# Patient Record
Sex: FEMALE | Race: WHITE | NOT HISPANIC OR LATINO | Employment: UNEMPLOYED | URBAN - METROPOLITAN AREA
[De-identification: names, ages, dates, MRNs, and addresses within clinical notes are randomized per-mention and may not be internally consistent; named-entity substitution may affect disease eponyms.]

---

## 2023-06-12 ENCOUNTER — TELEPHONE (OUTPATIENT)
Dept: PRIMARY CARE | Facility: CLINIC | Age: 63
End: 2023-06-12
Payer: COMMERCIAL

## 2023-06-15 NOTE — TELEPHONE ENCOUNTER
Relay [facility] labs   LFTs slightly elevated, similar to in past   Due to fatty liver   Weight loss is the only 'cure' for a fatty liver.   90% of the time it is a benign (not-harmful) condition but in ~10% of people the fat in the liver causes enough inflammation that ultimately progresses to cause cirrhosis of the liver. We will be monitoring the liver function tests on routine bloodwork from now on. If the liver tests seem to be getting worse over time I will recommend a referral to a liver specialist at which time a liver biopsy is usually recommended.     Vitamin D level is low- add Vitamin D supplement in the amount of 2,000 IU (is OTC) and make sure to take it with a meal- it needs fat present to be absorbed into the body.   Vitamin D deficiency has been implicated in heart disease, chronic pain, Fibromyalgia, hypertension, arthritis, depression, inflammatory bowel disease, obesity, Crohns Disease, several cancers, Multiple Sclerosis and other autoimmune diseases. Vitamin D deficiency can also cause symptoms of fatigue and myalgias (muscle aches).   Goal Vitamin D level is ~50. Vitamin D3 is available over-the-counter in 1,000 IU, 2,000 IU, and 5,000 IU tablets.

## 2023-06-16 NOTE — TELEPHONE ENCOUNTER
Spoke with Pt relayed lab results  Pt stated she saw the labs and is going to change her eating habits   Pt stated she is taking a multivitamin that has 1,000 international units of Vitamin D as well as  a regular Vitamin D 1,000iu   I told her to add the extra 1 Vitamin D capsule for a total of 2,000 international units and to make sure to take it with a meal  Pt verbally understood

## 2023-08-26 PROBLEM — K76.0 FATTY LIVER: Status: ACTIVE | Noted: 2023-08-26

## 2023-08-26 PROBLEM — Z86.010 HISTORY OF COLON POLYPS: Status: ACTIVE | Noted: 2023-08-26

## 2023-08-26 PROBLEM — R91.1 LUNG NODULE: Status: ACTIVE | Noted: 2023-08-26

## 2023-08-26 PROBLEM — R93.1 ABNORMAL SCREENING CARDIAC CT: Status: ACTIVE | Noted: 2023-08-26

## 2023-08-26 PROBLEM — Z87.42 H/O ABNORMAL CERVICAL PAPANICOLAOU SMEAR: Status: ACTIVE | Noted: 2023-08-26

## 2023-08-26 PROBLEM — M47.816 LUMBAR SPONDYLOSIS: Status: ACTIVE | Noted: 2023-08-26

## 2023-08-26 PROBLEM — Z86.0100 HISTORY OF COLON POLYPS: Status: ACTIVE | Noted: 2023-08-26

## 2023-08-26 PROBLEM — I47.10 PAROXYSMAL SVT (SUPRAVENTRICULAR TACHYCARDIA) (CMS-HCC): Status: ACTIVE | Noted: 2023-08-26

## 2023-08-26 PROBLEM — G43.909 MIGRAINE WITHOUT STATUS MIGRAINOSUS, NOT INTRACTABLE: Status: ACTIVE | Noted: 2023-08-26

## 2023-08-26 PROBLEM — E55.9 VITAMIN D DEFICIENCY: Status: ACTIVE | Noted: 2023-08-26

## 2023-08-26 PROBLEM — M85.9 DISORDER OF BONE DENSITY AND STRUCTURE, UNSPECIFIED: Status: ACTIVE | Noted: 2023-08-26

## 2023-08-26 PROBLEM — E78.2 MIXED HYPERLIPIDEMIA: Status: ACTIVE | Noted: 2023-08-26

## 2023-08-26 PROBLEM — J30.9 ALLERGIC RHINITIS: Status: ACTIVE | Noted: 2023-08-26

## 2023-08-26 PROBLEM — R03.0 ELEVATED BLOOD PRESSURE READING WITHOUT DIAGNOSIS OF HYPERTENSION: Status: ACTIVE | Noted: 2023-08-26

## 2023-08-26 PROBLEM — B00.1 HERPES LABIALIS: Status: ACTIVE | Noted: 2023-08-26

## 2023-08-26 PROBLEM — Z85.828 H/O NONMELANOMA SKIN CANCER: Status: ACTIVE | Noted: 2023-08-26

## 2023-08-26 PROBLEM — E80.4 GILBERT SYNDROME: Status: ACTIVE | Noted: 2023-08-26

## 2023-08-26 PROBLEM — Z00.00 ROUTINE ADULT HEALTH MAINTENANCE: Status: ACTIVE | Noted: 2023-08-26

## 2023-08-26 PROBLEM — E04.2 MULTINODULAR GOITER: Status: ACTIVE | Noted: 2023-08-26

## 2023-08-26 PROBLEM — J47.9 BRONCHIECTASIS (MULTI): Status: ACTIVE | Noted: 2023-08-26

## 2023-08-26 PROBLEM — I28.8 DILATION OF PULMONARY ARTERY (MULTI): Status: ACTIVE | Noted: 2023-08-26

## 2023-09-22 DIAGNOSIS — E78.2 MIXED HYPERLIPIDEMIA: ICD-10-CM

## 2023-09-22 DIAGNOSIS — Z86.19 HISTORY OF HERPES LABIALIS: ICD-10-CM

## 2023-09-22 DIAGNOSIS — I47.10 SVT (SUPRAVENTRICULAR TACHYCARDIA) (CMS-HCC): ICD-10-CM

## 2023-09-22 RX ORDER — VALACYCLOVIR HYDROCHLORIDE 500 MG/1
500 TABLET, FILM COATED ORAL EVERY 12 HOURS PRN
Qty: 90 TABLET | Refills: 3 | Status: SHIPPED | OUTPATIENT
Start: 2023-09-22 | End: 2024-04-25 | Stop reason: SDUPTHER

## 2023-09-22 RX ORDER — ROSUVASTATIN CALCIUM 10 MG/1
1 TABLET, COATED ORAL 2 TIMES WEEKLY
COMMUNITY
Start: 2020-08-24 | End: 2023-09-22 | Stop reason: SDUPTHER

## 2023-09-22 RX ORDER — BISOPROLOL FUMARATE 10 MG/1
10 TABLET, FILM COATED ORAL DAILY
COMMUNITY
Start: 2016-12-15 | End: 2023-09-22 | Stop reason: SDUPTHER

## 2023-09-22 RX ORDER — VALACYCLOVIR HYDROCHLORIDE 500 MG/1
500 TABLET, FILM COATED ORAL EVERY 12 HOURS PRN
COMMUNITY
Start: 2019-04-10 | End: 2023-09-22 | Stop reason: SDUPTHER

## 2023-09-22 RX ORDER — BISOPROLOL FUMARATE 10 MG/1
10 TABLET, FILM COATED ORAL DAILY
Qty: 90 TABLET | Refills: 3 | Status: SHIPPED | OUTPATIENT
Start: 2023-09-22

## 2023-09-22 RX ORDER — ROSUVASTATIN CALCIUM 10 MG/1
10 TABLET, COATED ORAL 2 TIMES WEEKLY
Qty: 24 TABLET | Refills: 3 | Status: SHIPPED | OUTPATIENT
Start: 2023-09-25

## 2023-09-22 NOTE — TELEPHONE ENCOUNTER
Patient stopped by office to return call from yesterday.  Had requested that Bisoprolol, Valtrex and Rosuvastatin refills be sent to [facility] on [address] in [place].  There are currently no medications or pharmacies listed in her chart.

## 2023-10-02 ENCOUNTER — OFFICE VISIT (OUTPATIENT)
Dept: PRIMARY CARE | Facility: CLINIC | Age: 63
End: 2023-10-02
Payer: COMMERCIAL

## 2023-10-02 VITALS
BODY MASS INDEX: 31.86 KG/M2 | TEMPERATURE: 97.3 F | HEIGHT: 67 IN | OXYGEN SATURATION: 95 % | HEART RATE: 62 BPM | SYSTOLIC BLOOD PRESSURE: 122 MMHG | DIASTOLIC BLOOD PRESSURE: 76 MMHG | WEIGHT: 203 LBS

## 2023-10-02 DIAGNOSIS — R91.1 LUNG NODULE: ICD-10-CM

## 2023-10-02 DIAGNOSIS — Z12.11 SCREENING FOR COLON CANCER: ICD-10-CM

## 2023-10-02 DIAGNOSIS — I28.8 DILATION OF PULMONARY ARTERY (MULTI): ICD-10-CM

## 2023-10-02 DIAGNOSIS — I47.10 PAROXYSMAL SVT (SUPRAVENTRICULAR TACHYCARDIA) (CMS-HCC): ICD-10-CM

## 2023-10-02 DIAGNOSIS — Z12.31 ENCOUNTER FOR SCREENING MAMMOGRAM FOR BREAST CANCER: ICD-10-CM

## 2023-10-02 DIAGNOSIS — E78.2 MIXED HYPERLIPIDEMIA: ICD-10-CM

## 2023-10-02 DIAGNOSIS — L23.7 POISON IVY: ICD-10-CM

## 2023-10-02 DIAGNOSIS — L65.9 HAIR LOSS: ICD-10-CM

## 2023-10-02 DIAGNOSIS — K76.0 FATTY LIVER: ICD-10-CM

## 2023-10-02 DIAGNOSIS — J47.9 BRONCHIECTASIS WITHOUT COMPLICATION (MULTI): ICD-10-CM

## 2023-10-02 DIAGNOSIS — E04.2 MULTINODULAR GOITER: ICD-10-CM

## 2023-10-02 DIAGNOSIS — Z12.4 SCREENING FOR CERVICAL CANCER: ICD-10-CM

## 2023-10-02 DIAGNOSIS — Z00.00 ROUTINE ADULT HEALTH MAINTENANCE: Primary | ICD-10-CM

## 2023-10-02 DIAGNOSIS — E55.9 VITAMIN D DEFICIENCY: ICD-10-CM

## 2023-10-02 PROBLEM — R03.0 ELEVATED BLOOD PRESSURE READING WITHOUT DIAGNOSIS OF HYPERTENSION: Status: RESOLVED | Noted: 2023-08-26 | Resolved: 2023-10-02

## 2023-10-02 PROCEDURE — 1036F TOBACCO NON-USER: CPT | Performed by: INTERNAL MEDICINE

## 2023-10-02 PROCEDURE — 88175 CYTOPATH C/V AUTO FLUID REDO: CPT

## 2023-10-02 PROCEDURE — 99396 PREV VISIT EST AGE 40-64: CPT | Performed by: INTERNAL MEDICINE

## 2023-10-02 RX ORDER — CHOLECALCIFEROL (VITAMIN D3) 25 MCG
50 TABLET ORAL DAILY
COMMUNITY
Start: 2019-09-11

## 2023-10-02 RX ORDER — ERYTHROMYCIN 500 MG/1
500 TABLET, COATED ORAL 2 TIMES DAILY
Qty: 6 TABLET | Refills: 0 | Status: SHIPPED | OUTPATIENT
Start: 2023-10-02 | End: 2023-10-05

## 2023-10-02 RX ORDER — ERYTHROMYCIN 500 MG/1
500 TABLET, COATED ORAL 4 TIMES DAILY
COMMUNITY
Start: 2019-12-20 | End: 2023-10-02 | Stop reason: SDUPTHER

## 2023-10-02 RX ORDER — TRIAMCINOLONE ACETONIDE 1 MG/G
1 CREAM TOPICAL 2 TIMES DAILY
COMMUNITY
Start: 2017-09-05 | End: 2023-10-02 | Stop reason: SDUPTHER

## 2023-10-02 RX ORDER — TRIAMCINOLONE ACETONIDE 1 MG/G
1 CREAM TOPICAL 2 TIMES DAILY
Qty: 45 G | Refills: 2 | Status: SHIPPED | OUTPATIENT
Start: 2023-10-02

## 2023-10-02 RX ORDER — LUTEIN 6 MG
1 TABLET ORAL DAILY
COMMUNITY
Start: 2020-03-06

## 2023-10-02 RX ORDER — ASPIRIN 81 MG/1
1 TABLET ORAL DAILY
COMMUNITY
Start: 2020-08-24

## 2023-10-02 RX ORDER — FLUTICASONE PROPIONATE 50 MCG
2 SPRAY, SUSPENSION (ML) NASAL DAILY
COMMUNITY
Start: 2020-03-06

## 2023-10-02 ASSESSMENT — PATIENT HEALTH QUESTIONNAIRE - PHQ9
SUM OF ALL RESPONSES TO PHQ9 QUESTIONS 1 AND 2: 0
1. LITTLE INTEREST OR PLEASURE IN DOING THINGS: NOT AT ALL
2. FEELING DOWN, DEPRESSED OR HOPELESS: NOT AT ALL

## 2023-10-02 NOTE — ASSESSMENT & PLAN NOTE
Annual Wellness exam completed   Preventive Health history reviewed:  Labs ordered    Mammogram ordered  BMD - plan for age 65  Cscope due in 2024   PAP done

## 2023-10-02 NOTE — PROGRESS NOTES
Reason for Visit: Annual Physical Exam    Assessment and Plan:  Problem List Items Addressed This Visit          High    Routine adult health maintenance - Primary    Overview     Declines flu and covid 19 vaccines  Tdap (Age 7+)12/10/2007  Td 5/17, 5/26/2020  Mamm 4/17/18, 4/30/19; 9/4/20, 10/14/21, 1/3/23  PAP/HPV 10/13, 8/6/18 (-)  BMD: 6/20/17  Cscope 7/25/17 (3yrs); 9/21 (3yrs)  Hep C Ab (-) 8/31/18 5/19: Our cuff: 142/88           Her cuff: 160/83         Current Assessment & Plan       Annual Wellness exam completed   Preventive Health history reviewed:  Labs ordered    Mammogram ordered  BMD - plan for age 65  Cscope due in 2024   PAP done           Relevant Medications    triamcinolone (Kenalog) 0.1 % cream    erythromycin base (E-Mycin) 500 mg tablet    Other Relevant Orders    Urinalysis with Reflex Microscopic    Comprehensive Metabolic Panel    CBC and Auto Differential    Lipid Panel       Medium    Bronchiectasis (CMS/HCC)    Overview     3/20 CT chest: There is mild, diffuse bronchiectasis  Used to see Dr Freeman, [facility]  Asymptomatic  Monitor         Dilation of pulmonary artery (CMS/HCC)    Overview     CT chest 10/21: main pulmonary artery measures up to 3.3 cm which could be secondary to underlying pulmonary hypertension.   ECHO 2019: The main pulmonary artery diameter is 2.1 cm (diastolic).  CT chest 6/21: main pulmonary artery are normal in caliber  Asymptomatic  Monitor         Encounter for screening mammogram for breast cancer    Relevant Orders    BI mammo bilateral screening tomosynthesis    Fatty liver    Relevant Orders    Hepatic function panel    Lung nodule    Overview     CT 6/21: Stable 4mm nodule LLL  CT 12/20: Stable nonaggressive appearing 5 mm left lower lobe nodule. No other nodule identified on today's examination. This can be followed with a CT noncontrast chest examination in March 2021, in order to document 12 month stability and confirm benignity  9/19 CT calcium  score test showed 4 mm nodule in the left lower lobe. This can be revisited with follow-up CT in 1 year  3/20 CTChest: There is an indeterminate, approximately 4 mm nodule seen within the left lower lobe. There is an indeterminate, approximately 4 mm subpleural nodule seen within the right lung base. Indeterminate 2 mm nodule seen within the right middle lobe. Other subcentimeter indeterminate pulmonary nodules are also seen;  10/21 CT: Unchanged 0.5 cm solid lung nodule within the left lower lobe. The main pulmonary artery measures up to 3.3 cm which could be secondary to underlying pulmonary hypertension.   s/p Pulm [facility]  no f/u needed as nodules have been stable;         Mixed hyperlipidemia    Overview     Goal LDL <100  On Crestor  6/23:          Current Assessment & Plan     Increase crestor to 2x/week         Relevant Orders    TSH with reflex to Free T4 if abnormal    Comprehensive Metabolic Panel    Lipid Panel    Lipid panel    Hepatic function panel    Multinodular goiter    Overview     3/20: US thyroid: NODULE 1:Location: Left midSize: 0.7 x 0.7 x 0.9 cmNODULE 2:Location: Left lower poleSize: 1.2 x 1.1 x 1.3 cm  US Follow up imaging is advised annually for 5 years.  Per Endo 12/21: I compared the images myself.   Your nodules are stable, Recommend monitoring with a repeat thyroid US in 2 years;    10/22 US: Thyroid nodules are again seen, unchanged from prior study         Current Assessment & Plan     US thyroid         Relevant Orders    TSH with reflex to Free T4 if abnormal    Comprehensive Metabolic Panel    CBC and Auto Differential    US thyroid    Paroxysmal SVT (supraventricular tachycardia)    Overview     On BB for prevention  Also on ASA         Screening for cervical cancer    Relevant Orders    THINPREP PAP TEST    Screening for colon cancer    Relevant Orders    Colonoscopy Screening    Vitamin D deficiency    Overview     Comment on above: Goal 40-50On supplementInsurance doesnt  "cover testing;  Goal 40-50Level was 27 in 2014Started On supplement 2019Insurance doesnt cover testing;         Relevant Orders    Vitamin D 25-Hydroxy,Total (for eval of Vitamin D levels)     Other Visit Diagnoses       Hair loss        Relevant Orders    Urinalysis with Reflex Microscopic    TSH    T3    Ferritin    Zinc            HPI: Had sciatica recently, 14 weeks  Started Biotin 1000mcg  Nails are splitting and mild hair loss after COVID      ROS otherwise negative aside from what was mentioned above in HPI.    Vitals  /76   Pulse 62   Temp 36.3 °C (97.3 °F)   Ht 1.702 m (5' 7\")   Wt 92.1 kg (203 lb)   SpO2 95%   BMI 31.79 kg/m²   Body mass index is 31.79 kg/m².  Physical Exam  Gen: Alert, NAD  HEENT:  Normal exam  Neck:  No masses/nodes palpable; Thyroid nontender and without nodules; No KERRI  Respiratory:  Lungs CTAB  CV: RRR  Neuro:  Gross motor and sensory intact  Skin:  No suspicious lesions present  Breast: No masses or axillary lymphadenopathy  Gyn: Normal pelvic exam: no uterine masses or cervical lesions, or CMT; no vaginal D/C. No ovarian or adnexal masses; No external vaginal or anal/perineal lesions (Pt declined chaperone)      Active Problem List  Patient Active Problem List   Diagnosis    Abnormal screening cardiac CT    Routine adult health maintenance    H/O abnormal cervical Papanicolaou smear    History of colon polyps    History of malignant neoplasm of skin    BMI 31.0-31.9,adult    Allergic rhinitis    Bronchiectasis (CMS/HCC)    Disorder of bone density and structure, unspecified    Dilation of pulmonary artery (CMS/HCC)    Fatty liver    Gilbert syndrome    Herpes labialis    Lumbar spondylosis    Lung nodule    Migraine without status migrainosus, not intractable    Mixed hyperlipidemia    Multinodular goiter    Ovarian cyst    Paroxysmal SVT (supraventricular tachycardia)    Vitamin D deficiency    Screening for colon cancer    Screening for cervical cancer    Encounter for " screening mammogram for breast cancer       Comprehensive Medical/Surgical/Social/Family History  Past Medical History:   Diagnosis Date    Abnormal screening cardiac CT 09/10/2021    9/29: IMPRESSION: 1. Coronary artery calcium score of 453*. 2. 98th percentile** for age, gender in asymptomatic patients. *Coronary Artery  Agatston score risk= Moderately increased >300 Has seen Cardiology [facility] on ASA 81mg    H/O abdominal ultrasound     11/18: US abdomen normal 3/20: US thyroid: NODULE 1: Location: Left mid Size: 0.7 x 0.7 x 0.9 cm NODULE 2: Location: Left lower pole Size: 1.2 x 1.1 x 1.3 cm    H/O bone density study     2013: -1.5FRAX 4.7/0.3% 6/25/17: THE LOWEST T-SCORE IS -1.5    H/O cardiovascular stress test     12/19: CONCLUSIONS:  1. SPECT Perfusion Study: Normal.  2. There is no scintigraphic evidence for inducible ischemia.  3. No evidence of scarred myocardium.  4. Good functional capacity for age and gender.  5. Left ventricle is normal in size. The left ventricle systolic function is normal.  6. Right ventricle is normal in size.  7. This is a low risk scan.    H/O CT scan of chest     10//21:Interval resolution of groundglass opacity within the right lower lobe. Unchanged 0.5 cm solid lung nodule within the left lower lobe. The main pulmonary artery measures up to 3.3 cm, ?underlying pulm HTN 6/21: Interval progression of focal 3.8 x 2 x 5 cm area of groundglass opacity right lower lobe medially,  4 mm nodule left lower lobe, unchanged. 1 cm nodule inferior left thyroid, stable    H/O CT scan of chest 09/10/2021    10/2021 [facility]: Interval resolution of groundglass opacity within the right lower lobe.   Unchanged 0.5 cm solid lung nodule within the left lower lobe.   The main pulmonary artery measures up to 3.3 cm which could be secondary  to underlying pulmonary hypertension.    H/O CT scan of chest     12/20:Stable nonaggressive appearing 5 mm left lower lobe nodule.  No other nodule identified on today's  examination.   3/20:Mild, diffuse bronchiectasis. No acute pulmonary process is identified. Indeterminate subcentimeter pulmonary nodules, measuring up to 4 mm in size. No follow-up imaging for this/these incidentally detected lung nodule(s) is recommended.    H/O diagnostic ultrasound     2021 [facility]: nodule 1 - no FNA or further imaging advised; nodule 2 - fu imaging at 1, 2, 3 and 5 y; nodule 3 - TI-RADS5 - FNA advised  3/20: Thyroid nodules are present. Fine needle aspiration is recommended for  the nodule in the left lower pole of detailed in the synoptic report. 10/22 US:  Thyroid nodules are again seen, unchanged from prior study    H/O echocardiogram     : Normal LV fx, Trivial MR and TR. : - The left ventricle is normal in size. Left ventricular systolic function is normal. EF = 68  5% (2D 4-ch.) Normal global strain -24.8%  - The right ventricle is normal in size. Right ventricular systolic function is normal.    H/O magnetic resonance imaging of lumbar spine     : DDD at L4-5    H/O pelvic ultrasound      (-)    History of PFTs     : Spirometry is normal.     Past Surgical History:   Procedure Laterality Date    APPENDECTOMY  2018    Appendectomy    BI ULTRASOUND FINE NEEDLE ASPIRATION  2020    FNA thyroid: 3/20: FINAL DIAGNOSIS  A. THYROID, LEFT LOBE, FINE NEEDLE ASPIRATE (THINPREP AND SMEARS)  Benign.  Consistent with colloid nodule.     SECTION, CLASSIC  2018     Section    CHOLECYSTECTOMY  2018    Cholecystectomy    COLONOSCOPY  2022 : Impression:          - Non-bleeding internal hemorrhoids(5 yrs) : - One 3 mm polyp in the distal ascending colon,            removed with a cold biopsy forceps. Resected and            retrieved.    DILATION AND CURETTAGE OF UTERUS  2018    Dilation And Curettage    TUBAL LIGATION  2018    Tubal Ligation     Social History     Social History Narrative    , 4 kids     Retired from [employer]-- daughter at [employer]    Nonsmoker    Social ETOH    ---    Family History:    M: Hep C/Cirrhosis ( age 70)    F: Mesothelioma ( age 76)    S: CA colon (age 60) vs kidney ( age 61)    no other siblings       Allergies and Medications  Epinephrine, Penicillins, and Poison ivy  Current Outpatient Medications   Medication Instructions    aspirin 81 mg EC tablet 1 tablet, oral, Daily    bisoprolol (ZEBETA) 10 mg, oral, Daily    cholecalciferol (VITAMIN D-3) 50 mcg, oral, Daily    erythromycin base (E-MYCIN) 500 mg, oral, 2 times daily    fluticasone (Flonase) 50 mcg/actuation nasal spray 2 sprays, nasal, Daily    multivitamin capsule 1 capsule, oral, Daily    rosuvastatin (CRESTOR) 10 mg, oral, 2 times weekly    triamcinolone (Kenalog) 0.1 % cream 1 Application, Topical, 2 times daily    valACYclovir (VALTREX) 500 mg, oral, Every 12 hours PRN    vitamin E acid succinate (vitamin E succinate) 268 mg (400 unit) tablet 1 tablet, oral, Daily

## 2023-10-13 LAB
CYTOLOGY CMNT CVX/VAG CYTO-IMP: NORMAL
HPV HR GENOTYPES PNL CVX NAA+PROBE: NEGATIVE
HPV HR GENOTYPES PNL CVX NAA+PROBE: NEGATIVE
HPV16 DNA SPEC QL NAA+PROBE: NEGATIVE
HPV18 DNA SPEC QL NAA+PROBE: NEGATIVE
LAB AP HPV GENOTYPE QUESTION: YES
LAB AP HPV HR: NORMAL
LABORATORY COMMENT REPORT: NORMAL
PATH REPORT.TOTAL CANCER: NORMAL

## 2023-10-13 RX ORDER — PREDNISONE 20 MG/1
TABLET ORAL
Qty: 25 TABLET | Refills: 0 | Status: SHIPPED | OUTPATIENT
Start: 2023-10-13

## 2023-12-18 ENCOUNTER — TELEPHONE (OUTPATIENT)
Dept: PRIMARY CARE | Facility: CLINIC | Age: 63
End: 2023-12-18
Payer: COMMERCIAL

## 2024-03-08 ENCOUNTER — TELEPHONE (OUTPATIENT)
Dept: PRIMARY CARE | Facility: CLINIC | Age: 64
End: 2024-03-08
Payer: COMMERCIAL

## 2024-03-08 DIAGNOSIS — K64.9 HEMORRHOIDS, UNSPECIFIED HEMORRHOID TYPE: Primary | ICD-10-CM

## 2024-03-08 RX ORDER — HYDROCORTISONE 25 MG/G
CREAM TOPICAL 4 TIMES DAILY PRN
Qty: 30 G | Refills: 5 | Status: SHIPPED | OUTPATIENT
Start: 2024-03-08 | End: 2025-03-08

## 2024-03-08 NOTE — TELEPHONE ENCOUNTER
Pt left  stating that she has some internal hemorrhoids. She has had them in the past but the Preparation H doesn't seem to be working and she remembers getting a RX medication in the past.  Please advise

## 2024-04-25 DIAGNOSIS — Z86.19 HISTORY OF HERPES LABIALIS: ICD-10-CM

## 2024-04-25 RX ORDER — VALACYCLOVIR HYDROCHLORIDE 500 MG/1
500 TABLET, FILM COATED ORAL EVERY 12 HOURS PRN
Qty: 90 TABLET | Refills: 3 | Status: SHIPPED | OUTPATIENT
Start: 2024-04-25

## 2024-10-07 DIAGNOSIS — E78.2 MIXED HYPERLIPIDEMIA: ICD-10-CM

## 2024-10-07 DIAGNOSIS — I47.10 SVT (SUPRAVENTRICULAR TACHYCARDIA) (CMS-HCC): ICD-10-CM

## 2024-10-08 RX ORDER — ROSUVASTATIN CALCIUM 10 MG/1
10 TABLET, COATED ORAL 2 TIMES WEEKLY
Qty: 24 TABLET | Refills: 3 | Status: SHIPPED | OUTPATIENT
Start: 2024-10-10

## 2024-10-08 RX ORDER — BISOPROLOL FUMARATE 10 MG/1
10 TABLET, FILM COATED ORAL DAILY
Qty: 90 TABLET | Refills: 3 | Status: SHIPPED | OUTPATIENT
Start: 2024-10-08

## 2025-01-24 ENCOUNTER — TELEPHONE (OUTPATIENT)
Dept: PRIMARY CARE | Facility: CLINIC | Age: 65
End: 2025-01-24
Payer: COMMERCIAL

## 2025-01-24 DIAGNOSIS — Z12.31 ENCOUNTER FOR SCREENING MAMMOGRAM FOR BREAST CANCER: ICD-10-CM

## 2025-01-24 NOTE — TELEPHONE ENCOUNTER
Pt called in requesting a referral be placed for -BI mammo bilateral screening tomosynthesis. Pt previous Mammo was completed 12/19/23 and results stated-     IMPRESSION: BENIGN FINDING   There is no mammographic evidence of malignancy. A 1 year screening   mammogram is recommended.     Pt would like to complete before appointment with PCP 2/17/25 and stated that she will pick referral up from office.     Referral formatted - sending to PCP for FYI

## 2025-02-17 ENCOUNTER — APPOINTMENT (OUTPATIENT)
Dept: PRIMARY CARE | Facility: CLINIC | Age: 65
End: 2025-02-17
Payer: COMMERCIAL

## 2025-02-17 ENCOUNTER — HOSPITAL ENCOUNTER (OUTPATIENT)
Dept: RADIOLOGY | Facility: EXTERNAL LOCATION | Age: 65
Discharge: HOME | End: 2025-02-17

## 2025-02-17 VITALS
WEIGHT: 197 LBS | DIASTOLIC BLOOD PRESSURE: 75 MMHG | HEIGHT: 67 IN | OXYGEN SATURATION: 95 % | TEMPERATURE: 99.3 F | HEART RATE: 52 BPM | BODY MASS INDEX: 30.92 KG/M2 | SYSTOLIC BLOOD PRESSURE: 118 MMHG

## 2025-02-17 DIAGNOSIS — M85.9 DISORDER OF BONE DENSITY AND STRUCTURE, UNSPECIFIED: ICD-10-CM

## 2025-02-17 DIAGNOSIS — Z71.89 CARDIAC RISK COUNSELING: ICD-10-CM

## 2025-02-17 DIAGNOSIS — Z71.89 ADVANCED DIRECTIVES, COUNSELING/DISCUSSION: ICD-10-CM

## 2025-02-17 DIAGNOSIS — Z13.39 ALCOHOL SCREENING: ICD-10-CM

## 2025-02-17 DIAGNOSIS — J47.9 BRONCHIECTASIS WITHOUT COMPLICATION (MULTI): ICD-10-CM

## 2025-02-17 DIAGNOSIS — I28.8 DILATION OF PULMONARY ARTERY (MULTI): ICD-10-CM

## 2025-02-17 DIAGNOSIS — Z78.0 MENOPAUSE: ICD-10-CM

## 2025-02-17 DIAGNOSIS — Z12.11 SCREENING FOR COLON CANCER: ICD-10-CM

## 2025-02-17 DIAGNOSIS — E04.2 MULTINODULAR GOITER: ICD-10-CM

## 2025-02-17 DIAGNOSIS — E78.2 MIXED HYPERLIPIDEMIA: ICD-10-CM

## 2025-02-17 DIAGNOSIS — Z13.9 ENCOUNTER FOR SCREENING INVOLVING SOCIAL DETERMINANTS OF HEALTH (SDOH): ICD-10-CM

## 2025-02-17 DIAGNOSIS — Z13.89 SCREENING FOR MULTIPLE CONDITIONS: ICD-10-CM

## 2025-02-17 DIAGNOSIS — Z86.0100 HISTORY OF COLON POLYPS: ICD-10-CM

## 2025-02-17 DIAGNOSIS — I47.10 PAROXYSMAL SVT (SUPRAVENTRICULAR TACHYCARDIA) (CMS-HCC): ICD-10-CM

## 2025-02-17 DIAGNOSIS — E66.811 CLASS 1 OBESITY: ICD-10-CM

## 2025-02-17 DIAGNOSIS — Z12.31 ENCOUNTER FOR SCREENING MAMMOGRAM FOR BREAST CANCER: ICD-10-CM

## 2025-02-17 DIAGNOSIS — K76.0 FATTY LIVER: ICD-10-CM

## 2025-02-17 DIAGNOSIS — Z13.6 SCREENING FOR CARDIOVASCULAR CONDITION: ICD-10-CM

## 2025-02-17 DIAGNOSIS — E55.9 VITAMIN D DEFICIENCY: ICD-10-CM

## 2025-02-17 DIAGNOSIS — R91.1 LUNG NODULE: ICD-10-CM

## 2025-02-17 DIAGNOSIS — R93.1 ABNORMAL SCREENING CARDIAC CT: ICD-10-CM

## 2025-02-17 DIAGNOSIS — Z00.00 ROUTINE ADULT HEALTH MAINTENANCE: Primary | ICD-10-CM

## 2025-02-17 PROBLEM — B00.1 HERPES LABIALIS: Status: RESOLVED | Noted: 2023-08-26 | Resolved: 2025-02-17

## 2025-02-17 PROCEDURE — G0442 ANNUAL ALCOHOL SCREEN 15 MIN: HCPCS | Performed by: INTERNAL MEDICINE

## 2025-02-17 PROCEDURE — G0446 INTENS BEHAVE THER CARDIO DX: HCPCS | Performed by: INTERNAL MEDICINE

## 2025-02-17 PROCEDURE — G0403 EKG FOR INITIAL PREVENT EXAM: HCPCS | Performed by: INTERNAL MEDICINE

## 2025-02-17 PROCEDURE — 1160F RVW MEDS BY RX/DR IN RCRD: CPT | Performed by: INTERNAL MEDICINE

## 2025-02-17 PROCEDURE — G2211 COMPLEX E/M VISIT ADD ON: HCPCS | Performed by: INTERNAL MEDICINE

## 2025-02-17 PROCEDURE — G0447 BEHAVIOR COUNSEL OBESITY 15M: HCPCS | Performed by: INTERNAL MEDICINE

## 2025-02-17 PROCEDURE — 1159F MED LIST DOCD IN RCRD: CPT | Performed by: INTERNAL MEDICINE

## 2025-02-17 PROCEDURE — 1036F TOBACCO NON-USER: CPT | Performed by: INTERNAL MEDICINE

## 2025-02-17 PROCEDURE — 3008F BODY MASS INDEX DOCD: CPT | Performed by: INTERNAL MEDICINE

## 2025-02-17 PROCEDURE — 99214 OFFICE O/P EST MOD 30 MIN: CPT | Performed by: INTERNAL MEDICINE

## 2025-02-17 PROCEDURE — G0402 INITIAL PREVENTIVE EXAM: HCPCS | Performed by: INTERNAL MEDICINE

## 2025-02-17 RX ORDER — NICOTINE POLACRILEX 2 MG
1 GUM BUCCAL DAILY
COMMUNITY

## 2025-02-17 RX ORDER — CHOLECALCIFEROL (VITAMIN D3) 25 MCG
2000 TABLET ORAL DAILY
Start: 2025-02-17

## 2025-02-17 SDOH — ECONOMIC STABILITY: FOOD INSECURITY: WITHIN THE PAST 12 MONTHS, THE FOOD YOU BOUGHT JUST DIDN'T LAST AND YOU DIDN'T HAVE MONEY TO GET MORE.: NEVER TRUE

## 2025-02-17 SDOH — ECONOMIC STABILITY: INCOME INSECURITY: IN THE LAST 12 MONTHS, WAS THERE A TIME WHEN YOU WERE NOT ABLE TO PAY THE MORTGAGE OR RENT ON TIME?: NO

## 2025-02-17 SDOH — ECONOMIC STABILITY: TRANSPORTATION INSECURITY
IN THE PAST 12 MONTHS, HAS LACK OF TRANSPORTATION KEPT YOU FROM MEETINGS, WORK, OR FROM GETTING THINGS NEEDED FOR DAILY LIVING?: NO

## 2025-02-17 SDOH — ECONOMIC STABILITY: FOOD INSECURITY: WITHIN THE PAST 12 MONTHS, YOU WORRIED THAT YOUR FOOD WOULD RUN OUT BEFORE YOU GOT MONEY TO BUY MORE.: NEVER TRUE

## 2025-02-17 SDOH — ECONOMIC STABILITY: TRANSPORTATION INSECURITY
IN THE PAST 12 MONTHS, HAS THE LACK OF TRANSPORTATION KEPT YOU FROM MEDICAL APPOINTMENTS OR FROM GETTING MEDICATIONS?: NO

## 2025-02-17 ASSESSMENT — PATIENT HEALTH QUESTIONNAIRE - PHQ9
SUM OF ALL RESPONSES TO PHQ9 QUESTIONS 1 AND 2: 0
2. FEELING DOWN, DEPRESSED OR HOPELESS: NOT AT ALL
1. LITTLE INTEREST OR PLEASURE IN DOING THINGS: NOT AT ALL

## 2025-02-17 ASSESSMENT — SOCIAL DETERMINANTS OF HEALTH (SDOH): IN THE PAST 12 MONTHS, HAS THE ELECTRIC, GAS, OIL, OR WATER COMPANY THREATENED TO SHUT OFF SERVICE IN YOUR HOME?: NO

## 2025-02-17 NOTE — ASSESSMENT & PLAN NOTE
Annual Wellness exam completed   Preventive Health History reviewed  Ordered:   Labs    Mammogram   BMD   Cscope

## 2025-02-17 NOTE — PROGRESS NOTES
Annual Medicare Wellness Exam/Comprehensive Problem Focused Follow Up  HPI/CC  Chief Complaint   Patient presents with    Welcome To Medicare     Reviewed chart for care received since last appointment.  No new issues  Sees eye MD and dentist regularly    Assessment and Plan:  Problem List Items Addressed This Visit          High    Routine adult health maintenance - Primary    Overview     Declines flu and covid 19 vaccines  Tdap (Age 7+)12/10/2007  Td 5/17, 5/26/2020  Mamm 4/17/18, 4/30/19; 9/4/20, 10/14/21, 1/3/23, 12/21/23, 02/03/25  PAP/HPV 10/13, 8/6/18 (-); 10/13/23 (-)  BMD: 6/20/17  Cscope 7/25/17 (3yrs); 9/21 (3yrs)  Hep C Ab (-) 8/31/18 5/19: Our cuff: 142/88           Her cuff: 160/83         Current Assessment & Plan     Annual Wellness exam completed   Preventive Health History reviewed  Ordered:   Labs    Mammogram   BMD   Cscope                   Medium    Abnormal screening cardiac CT    Overview     9/19: Coronary artery calcium score of 453 (LAD/LM)  98th percentile for age, gender in asymptomatic patients. *Coronary Artery  Agatston score risk= Moderately increased >300   Has seen Cardiology [facility]   Goal LDL<100  on ASA 81mg and statin         Relevant Orders    CT cardiac scoring wo IV contrast    ECG 12 lead (Clinic Performed) (Completed)    Advanced directives, counseling/discussion    Overview     2/17/25: HCPOA will be  (Jere Rivera)  FULL CODE         Alcohol screening    Overview     02/17/25: 5 minutes spent screening for alcohol misuse  See snapshot (social documentation) for details.           BMI 30.0-30.9,adult    Overview     She read the Obesity Code  Doing IF         Current Assessment & Plan     15 minutes spent discussing:  BMI was above normal measurement. Current weight: 89.4 kg (197 lb)  Weight change since last visit (-) denotes wt loss -6 lbs   Weight loss needed to achieve BMI 25: 37.7 Lbs  Weight loss needed to achieve BMI 30: 5.9 Lbs  Continue IF Consider GLP1  med (danny if gains FDA approval for fatty liver treatment)           Bronchiectasis    Overview     3/20 CT chest: There is mild, diffuse bronchiectasis  Used to see Dr Freeman, [facility]  Asymptomatic  Monitor         Cardiac risk counseling    Overview     2/17/25: ASCVD risk 5.9%  On ASA by cardiologist (elevated Calcium score)         Dilation of pulmonary artery (Multi)    Overview     ECHO 2019: The main pulmonary artery diameter is 2.1 cm (diastolic).  CT chest 6/21: main pulmonary artery are normal in caliber  CT chest 10/21: main pulmonary artery measures up to 3.3 cm which could be secondary to underlying pulmonary hypertension.   Asymptomatic  Monitor         Disorder of bone density and structure, unspecified    Overview     BMD 11/19: Lowest T score -1.6.   10-year absolute fracture risk:  major osteoporotic fracture = 7.5 %   hip fracture = 0.6 %          Relevant Orders    XR DEXA bone density (Completed)    Encounter for screening involving social determinants of health (SDoH)    Overview     02/17/25: 5 minutes spent on SDOH screening.  Specifically: Housing, Food Insecurity, Utilities and Transportatin Needs were evaluated   (See Screenings in Rooming section for documentation)           Encounter for screening mammogram for breast cancer    Relevant Orders    BI mammo bilateral screening tomosynthesis (Completed)    Fatty liver    Overview     US 2011: Fatty infiltration of the liver         Current Assessment & Plan     Elevated FIB-4 score  Will get Fibroscan         Relevant Orders    US thyroid (Completed)    Liver Elastography (Fibroscan)    History of colon polyps    Overview     9/21: Sessile serrated polyp         Relevant Orders    Colonoscopy Screening; Average Risk Patient    Lung nodule    Overview     CT 6/21: Stable 4mm nodule LLL  CT 12/20: Stable nonaggressive appearing 5 mm left lower lobe nodule. No other nodule identified on today's examination. This can be followed with a CT  noncontrast chest examination in March 2021, in order to document 12 month stability and confirm benignity  9/19 CT calcium score test showed 4 mm nodule in the left lower lobe. This can be revisited with follow-up CT in 1 year  3/20 CTChest: There is an indeterminate, approximately 4 mm nodule seen within the left lower lobe. There is an indeterminate, approximately 4 mm subpleural nodule seen within the right lung base. Indeterminate 2 mm nodule seen within the right middle lobe. Other subcentimeter indeterminate pulmonary nodules are also seen;  10/21 CT: Unchanged 0.5 cm solid lung nodule within the left lower lobe. The main pulmonary artery measures up to 3.3 cm which could be secondary to underlying pulmonary hypertension.   s/p Pulm [facility]  no f/u needed as nodules have been stable;         Menopause    Relevant Orders    XR DEXA bone density (Completed)    Mixed hyperlipidemia    Overview     Goal LDL <100  On Crestor  6/23:          Relevant Orders    CT cardiac scoring wo IV contrast    TSH with reflex to Free T4 if abnormal    Comprehensive Metabolic Panel    CBC and Auto Differential    Lipid Panel    Urinalysis with Reflex Microscopic    TSH with reflex to Free T4 if abnormal    Comprehensive Metabolic Panel    CBC and Auto Differential    Lipid Panel    Urinalysis with Reflex Microscopic    Multinodular goiter    Overview     3/20: US thyroid: NODULE 1:Location: Left midSize: 0.7 x 0.7 x 0.9 cmNODULE 2:Location: Left lower poleSize: 1.2 x 1.1 x 1.3 cm  US Follow up imaging is advised annually for 5 years.  Per Endo 12/21: I compared the images myself.   Your nodules are stable, Recommend monitoring with a repeat thyroid US in 2 years;    10/22 US: Thyroid nodules are again seen, unchanged from prior study  12/23: Similar appearance of a multinodular goiter.  Unchanged 2 TI-RADS 4 lesions within the left mid and lower poles.   NODULE 1:  Left Mid pole Size: 1.0 x 1.3 x 0.8 cm, previously 0.9 x 1.3 x  "0.8 cm TI-RADS Category: TR4 ACR Recommendation: TI-RADS 4 nodule.  Follow up imaging in 1, 2, 3 and 5 years is advised.   NODULE 2: Left lower pole  Size: 1.3 x 1.4 x 1.1 cm, previously 1.3 x 1.4 x 1.1 cm . TI-RADS Category: TR4 ACR Recommendation: TI-RADS 4 nodule.  Follow up imaging in 1, 2, 3 and 5 years is advised.   NODULE 3: Location: Mid isthmus Size: 0.9 x 0.8 x 0.5 cm, previously 0.7 x 0.5 x 0.5 cm. TI-RADS Category: TR3 ACR Recommendation: TI-RADS 3 nodule.    Follow up imaging in 1, 2, 3 and 5 years is advised for the TR4 nodule         Paroxysmal SVT (supraventricular tachycardia) (CMS-HCC)    Overview     On BB for prevention  Also on ASA         Relevant Orders    ECG 12 lead (Clinic Performed) (Completed)    Screening for colon cancer    Relevant Orders    Colonoscopy Screening; Average Risk Patient    Screening for multiple conditions    Overview     02/17/25:Depression screening completed using the PHQ-2 questions with results documented in the chart/encounter  (See Rooming Screenings for documentation)            Vitamin D deficiency    Overview     Level was 27 in 2014  On supplement          Relevant Medications    cholecalciferol (Vitamin D-3) 25 MCG (1000 UT) tablet    Other Relevant Orders    Vitamin D 25-Hydroxy,Total (for eval of Vitamin D levels)    Vitamin B12    Vitamin D 25-Hydroxy,Total (for eval of Vitamin D levels)    Vitamin B12     Other Visit Diagnoses       Screening for cardiovascular condition        Relevant Orders    CT cardiac scoring wo IV contrast    Class 1 obesity [E66.811]                  ROS otherwise negative aside from what was mentioned above in HPI.    Vitals  /75   Pulse 52   Temp 37.4 °C (99.3 °F)   Ht 1.702 m (5' 7\")   Wt 89.4 kg (197 lb)   SpO2 95%   BMI 30.85 kg/m²   Body mass index is 30.85 kg/m².  Physical Exam  Gen: Alert, NAD  HEENT:  Unremarkable  Neck:  No KERRI  Respiratory:  Lungs CTAB  Cardiovascular:  Heart RRR  Neuro:  Gross motor and " sensory intact  Skin:  No suspicious lesions present  Breast: No masses, or axillary lymphadenopathy  Gyn: Normal pelvic exam: no uterine masses or cervical lesions, or CMT; no vaginal D/C. No ovarian or adnexal masses; No external vaginal or anal/perineal lesions (Pt declined chaperone)    EKG: NSR, elevated J point II, downgoing T wave AVL  asymptomatic    Patient Care Team:  Seema Bella MD as PCP - General (Internal Medicine)  INDIO Vasquez-CNP as Referring Physician (Family Medicine)  Mar Freeman MD as Referring Physician (Pulmonary Disease)  Robert Hayward MD as Referring Physician (Interventional Cardiology)  Marek Benitez OD as Referring Physician (Optometry)     Health Risk Assessment:  Patient was asked if he/she has any difficulty performing the following activities of daily living:  Preparing nutritious food and eating? No  Grocery shopping? No  Bathing and grooming yourself? No  Getting dressed? No  Using the toilet?No  Using the phone? No  Moving around from place to place (physical ambulation)? No  Doing housework (including laundry) independently? No  Managing finances independently? No  Managing medications independently? No  Doing housework (including laundry) independently? No    Patient was asked if he/she:  Feels safe in current home environment?: Yes  Uses seatbelt? Yes  Sees the dentist regularly? Yes  Exercises regularly: No  Suffers from depression, stress, anger, loneliness or social isolation, pain, suicidality? No    Dietary issues discussed: Yes  Cognitive Impairment No  Hearing difficulties: No  Visual Acuity assessed: Yes, Snellen chart dine and also see eye MD regularly too    What is your self-assessment of overall health status and life satisfaction? Excellent     5 minutes spent on SDOH screening:   Specifically Housing, Food Insecurity, Utilities and Transportatin Needs were evaluated   (See Screenings in Rooming section for documentation)  Food Insecurity: No Food  Insecurity (2/17/2025)    Hunger Vital Sign     Worried About Running Out of Food in the Last Year: Never true     Ran Out of Food in the Last Year: Never true     Housing Stability: Unknown (2/17/2025)    Housing Stability Vital Sign     Unable to Pay for Housing in the Last Year: No     Number of Times Moved in the Last Year: Not on file     Homeless in the Last Year: No     Transportation Needs: No Transportation Needs (2/17/2025)    PRAPARE - Transportation     Lack of Transportation (Medical): No     Lack of Transportation (Non-Medical): No       Allergies and Medications  Epinephrine, Penicillins, and Poison ivy  Current Outpatient Medications   Medication Instructions    aspirin 81 mg EC tablet 1 tablet, Daily    biotin 1 mg capsule 1 capsule, oral, Daily    bisoprolol (ZEBETA) 10 mg, oral, Daily    cholecalciferol (VITAMIN D-3) 2,000 Units, oral, Daily    fluticasone (Flonase) 50 mcg/actuation nasal spray 2 sprays, nasal, Daily PRN    hydrocortisone (Anusol-HC) 2.5 % rectal cream rectal, 4 times daily PRN    multivitamin capsule 1 capsule, Daily    predniSONE (Deltasone) 20 mg tablet Take 2 tabs (40mg) daily for 7 days, then take 1 tab (20mg) daily for 7 days, then take 1/2 tab (10mg) daily for 7 days.    rosuvastatin (CRESTOR) 10 mg, oral, 2 times weekly    triamcinolone (Kenalog) 0.1 % cream 1 Application, Topical, 2 times daily    valACYclovir (VALTREX) 500 mg, oral, Every 12 hours PRN    vitamin E acid succinate (vitamin E succinate) 268 mg (400 unit) tablet 1 tablet, oral, Daily       Medications and Supplements  prescribed by me and other practitioners or clinical pharmacist (such as prescriptions, OTC's, herbal therapies and supplements) were reviewed and documented in the medical record.      Active Problem List  Patient Active Problem List   Diagnosis    Abnormal screening cardiac CT    Routine adult health maintenance    H/O abnormal cervical Papanicolaou smear    History of colon polyps    History  of malignant neoplasm of skin    BMI 30.0-30.9,adult    Allergic rhinitis    Bronchiectasis    Disorder of bone density and structure, unspecified    Dilation of pulmonary artery (Multi)    Fatty liver    Gilbert syndrome    Lumbar spondylosis    Lung nodule    Migraine without status migrainosus, not intractable    Mixed hyperlipidemia    Multinodular goiter    Ovarian cyst    Paroxysmal SVT (supraventricular tachycardia) (CMS-HCC)    Vitamin D deficiency    Screening for colon cancer    Screening for cervical cancer    Encounter for screening mammogram for breast cancer    Alcohol screening    Encounter for screening involving social determinants of health (SDoH)    Screening for multiple conditions    Advanced directives, counseling/discussion    Cardiac risk counseling    Menopause       Comprehensive Medical/Surgical/Social/Family History  Past Medical History:   Diagnosis Date    Abnormal screening cardiac CT 09/10/2021    9/29: 1. Coronary artery calcium score of 453*. 2. 98th percentile** for age, gender in asymptomatic patients. *Coronary Artery  Agatston score risk= Moderately increased >300 Has seen Cardiology [facility] on ASA 81mg    H/O abdominal ultrasound     11/18: US abdomen normal 3/20: US thyroid: NODULE 1: Location: Left mid Size: 0.7 x 0.7 x 0.9 cm NODULE 2: Location: Left lower pole Size: 1.2 x 1.1 x 1.3 cm    H/O bone density study     2013: -1.5FRAX 4.7/0.3% 6/25/17: THE LOWEST T-SCORE IS -1.5    H/O bone density study 11/2019    Lowest T score -1.6. 10-year absolute fracture risk:                                         - major osteoporotic fracture =  7.5 %                                         - hip fracture = 0.6 %    H/O cardiovascular stress test     12/19: CONCLUSIONS:  1. SPECT Perfusion Study: Normal.  2. There is no scintigraphic evidence for inducible ischemia.  3. No evidence of scarred myocardium.  4. Good functional capacity for age and gender.  5. Left ventricle is normal in size.  The left ventricle systolic function is normal.  6. Right ventricle is normal in size.  7. This is a low risk scan.    H/O CT scan of chest     10//21:Interval resolution of groundglass opacity within the right lower lobe. Unchanged 0.5 cm solid lung nodule within the left lower lobe. The main pulmonary artery measures up to 3.3 cm, ?underlying pulm HTN 6/21: Interval progression of focal 3.8 x 2 x 5 cm area of groundglass opacity right lower lobe medially,  4 mm nodule left lower lobe, unchanged. 1 cm nodule inferior left thyroid, stable    H/O CT scan of chest 09/10/2021    10/2021 [facility]: Interval resolution of groundglass opacity within the right lower lobe.   Unchanged 0.5 cm solid lung nodule within the left lower lobe.   The main pulmonary artery measures up to 3.3 cm which could be secondary  to underlying pulmonary hypertension.    H/O CT scan of chest     12/20:Stable nonaggressive appearing 5 mm left lower lobe nodule.  No other nodule identified on today's examination.   3/20:Mild, diffuse bronchiectasis. No acute pulmonary process is identified. Indeterminate subcentimeter pulmonary nodules, measuring up to 4 mm in size. No follow-up imaging for this/these incidentally detected lung nodule(s) is recommended.    H/O diagnostic ultrasound     11/2021 [facility]: nodule 1 - no FNA or further imaging advised; nodule 2 - fu imaging at 1, 2, 3 and 5 y; nodule 3 - TI-RADS5 - FNA advised  3/20: Thyroid nodules are present. Fine needle aspiration is recommended for  the nodule in the left lower pole of detailed in the synoptic report. 10/22 US:  Thyroid nodules are again seen, unchanged from prior study    H/O diagnostic ultrasound 12/18/2023    US thyroid: Similar appearance of a multinodular goiter.  Unchanged 2 TI-RADS 4  lesions within the left mid and lower poles.    H/O echocardiogram     2007: Normal LV fx, Trivial MR and TR. 12/19: - The left ventricle is normal in size. Left ventricular systolic function is  normal. EF = 68  5% (2D 4-ch.) Normal global strain -24.8%  - The right ventricle is normal in size. Right ventricular systolic function is normal.    H/O magnetic resonance imaging of lumbar spine     : DDD at L4-5    H/O pelvic ultrasound      (-)    History of PFTs     : Spirometry is normal.     Past Surgical History:   Procedure Laterality Date    APPENDECTOMY  2018    Appendectomy    BI US FINE NEEDLE ASPIRATION  2020    FNA thyroid: 3/20: FINAL DIAGNOSIS  A. THYROID, LEFT LOBE, FINE NEEDLE ASPIRATE (THINPREP AND SMEARS)  Benign.  Consistent with colloid nodule.     SECTION, CLASSIC  2018     Section    CHOLECYSTECTOMY  2018    Cholecystectomy    COLONOSCOPY  2022 : Impression:          - Non-bleeding internal hemorrhoids(5 yrs) : - One 3 mm polyp in the distal ascending colon,            removed with a cold biopsy forceps. Resected and            retrieved.    DILATION AND CURETTAGE OF UTERUS  2018    Dilation And Curettage    TUBAL LIGATION  2018    Tubal Ligation     Social History     Social History Narrative    Social History:    , 4 kids    Retired from [employer]    Nonsmoker    Social ETOH: 2/week    ---    Family History:    M: Hep C/Cirrhosis ( age 70)    F: Mesothelioma ( age 76)    S: CA colon (age 60) vs kidney CA ( age 61)    no other siblings         Tobacco/Alcohol/Opioid use, as well as Illicit Drug Use was screened for/reviewed and documented in Social Documentation section of the chart and medication list as appropriate     Depression Screening  Depression screening completed using the PHQ-2 questions, with results documented in the chart/encounter (5 min spent on this).  Patient Health Questionnaire-2 Score: 0 (2025  8:07 AM)  (See Rooming Screening section for documentation, and/or progress note for additional information)    Cardiac Risk Assessment  (15 minutes spent on this)  Cardiovascular risk was discussed and, if needed, lifestyle modifications recommended, including nutritional choices, exercise, and elimination of habits contributing to risk. We agreed on a plan to reduce the current cardiovascular risk based on above discussion as needed.     Aspirin use/disuse was discussed and documented in the Problem List of the medical record (under Cardiac Risk Counseling) after reviewing the updated guidelines below:  Consider low dose Aspirin ( mg) use if the benefit for cardiovascular disease prevention outweighs risk for bleeding complications.   Discussed that in general, low dose ASA should be considered:  In patients WITHOUT prior MI/stroke/PAD (primary prevention):   a. Age <60: Use if 10-year cardiovascular disease risk >20%, with discussion of risks and benefits with patient  b. Age 60-<70: Use if 10-year cardiovascular disease risk >20% and low bleeding (e.g., gastrointestinal) risk, with discussion of risks and benefits with patient  c. Age >=70: Do not use    In patients WITH prior MI/stroke/PAD (secondary prevention):   Generally use unless extremely high bleeding (e.g., gastrointenstinal) risk, with discussion of risks and benefits with patient    Advance Directives Discussion  Advanced Care Planning (including a Living Will, Healthcare POA, as well as specific end of life choices and/or directives), was discussed with the patient and/or surrogate, voluntarily, and details of that discussion documented in the Problem List (under Advanced Directives Discussion) of the medical record.   (16 min spent discussing above)     During the course of the visit the patient was educated and counseled about age appropriate screening and preventive services.   Completed preventive screenings were documented in the chart (see Routine Health Maintenance in Problem List) and orders were placed for outstanding screenings/procedures as documented in the  Assessment and Plan.    Patient Instructions (the written plan) was given to the patient at check out as part of the AVS.

## 2025-02-17 NOTE — ASSESSMENT & PLAN NOTE
15 minutes spent discussing:  BMI was above normal measurement. Current weight: 89.4 kg (197 lb)  Weight change since last visit (-) denotes wt loss -6 lbs   Weight loss needed to achieve BMI 25: 37.7 Lbs  Weight loss needed to achieve BMI 30: 5.9 Lbs  Continue IF Consider GLP1 med (danny if gains FDA approval for fatty liver treatment)

## 2025-02-21 DIAGNOSIS — E04.2 MULTINODULAR GOITER: ICD-10-CM

## 2025-04-25 ENCOUNTER — TELEPHONE (OUTPATIENT)
Dept: PRIMARY CARE | Facility: CLINIC | Age: 65
End: 2025-04-25
Payer: MEDICARE

## 2025-04-25 ENCOUNTER — OFFICE VISIT (OUTPATIENT)
Dept: PRIMARY CARE | Facility: CLINIC | Age: 65
End: 2025-04-25
Payer: MEDICARE

## 2025-04-25 NOTE — TELEPHONE ENCOUNTER
Pt called states she cut herself yesterday with pruning sheers that were dirty her last td was 5/26/2020 called let her know good for 10 years but cdc recommends booster if not from clean cut and more than 5 years since last shot let her know can get it due to it being so close to the 5 year sedrick

## 2025-04-25 NOTE — PROGRESS NOTES
"Problem List Items Addressed This Visit    None       Subjective   Patient ID: Verónica Rivera is a 65 y.o. female who presents for No chief complaint on file..  HPI  Wound RUE  Dirty gardening tool        Review of Systems    BP Readings from Last 3 Encounters:   02/17/25 118/75   10/02/23 122/76   09/19/22 126/66      Wt Readings from Last 3 Encounters:   02/17/25 89.4 kg (197 lb)   10/02/23 92.1 kg (203 lb)   09/19/22 94.9 kg (209 lb 2 oz)      BMI:   Estimated body mass index is 30.85 kg/m² as calculated from the following:    Height as of 2/17/25: 1.702 m (5' 7\").    Weight as of 2/17/25: 89.4 kg (197 lb).    Objective   Physical Exam  "

## 2025-05-01 ENCOUNTER — TELEPHONE (OUTPATIENT)
Dept: PRIMARY CARE | Facility: CLINIC | Age: 65
End: 2025-05-01
Payer: MEDICARE

## 2025-05-01 NOTE — TELEPHONE ENCOUNTER
Patient left  asking her orders for the mammogram and the Bone Density be faxed to [facility] [phone].  She also stated some one told her she already had her mammogram done on 2/17/2025.  She did not have it done that is when the order was placed.  I faxed the orders and left  for patient asking her to call the office.

## 2025-05-12 ENCOUNTER — TELEPHONE (OUTPATIENT)
Dept: PRIMARY CARE | Facility: CLINIC | Age: 65
End: 2025-05-12
Payer: MEDICARE

## 2025-05-12 DIAGNOSIS — E04.2 MULTINODULAR GOITER: ICD-10-CM

## 2025-05-12 NOTE — TELEPHONE ENCOUNTER
Left VM relaying result of US Thyroid was essentially unchanged and if any questions to call office.  Relayed to follow up in 1 year with another US Thyroid.  Formatted

## 2025-05-20 ENCOUNTER — TELEPHONE (OUTPATIENT)
Dept: PRIMARY CARE | Facility: CLINIC | Age: 65
End: 2025-05-20
Payer: MEDICARE

## 2025-07-02 DIAGNOSIS — E78.2 MIXED HYPERLIPIDEMIA: ICD-10-CM

## 2025-07-02 RX ORDER — ROSUVASTATIN CALCIUM 10 MG/1
10 TABLET, COATED ORAL 2 TIMES WEEKLY
Qty: 24 TABLET | Refills: 3 | Status: SHIPPED | OUTPATIENT
Start: 2025-07-03

## 2025-08-21 ENCOUNTER — TELEPHONE (OUTPATIENT)
Dept: PRIMARY CARE | Facility: CLINIC | Age: 65
End: 2025-08-21
Payer: MEDICARE

## 2025-09-04 ENCOUNTER — TELEPHONE (OUTPATIENT)
Dept: PRIMARY CARE | Facility: CLINIC | Age: 65
End: 2025-09-04
Payer: MEDICARE

## 2026-03-16 ENCOUNTER — APPOINTMENT (OUTPATIENT)
Dept: PRIMARY CARE | Facility: CLINIC | Age: 66
End: 2026-03-16
Payer: MEDICARE